# Patient Record
Sex: FEMALE | URBAN - METROPOLITAN AREA
[De-identification: names, ages, dates, MRNs, and addresses within clinical notes are randomized per-mention and may not be internally consistent; named-entity substitution may affect disease eponyms.]

---

## 2022-09-08 ENCOUNTER — TELEPHONE (OUTPATIENT)
Dept: OBSTETRICS AND GYNECOLOGY | Facility: CLINIC | Age: 52
End: 2022-09-08
Payer: COMMERCIAL

## 2022-09-08 NOTE — TELEPHONE ENCOUNTER
----- Message from Татьяна Perera sent at 9/8/2022  3:27 PM CDT -----  Contact: GERMAN- mom  Type:  Sooner Apoointment Request    Caller is requesting a sooner appointment.  Caller declined first available appointment listed below.  Caller will not accept being placed on the waitlist and is requesting a message be sent to doctor.  Name of Caller:German (mother)  When is the first available appointment? 2/4 @ 400pm  Symptoms: Hormonal issues  Would the patient rather a call back or a response via [facility]?  Call back   Best Call Back Number: [phone]   Additional Information: She saw Dr Tabor 20 years ago. Has several issues and needs to be seen asap.

## 2022-09-21 ENCOUNTER — TELEPHONE (OUTPATIENT)
Dept: OBSTETRICS AND GYNECOLOGY | Facility: CLINIC | Age: 52
End: 2022-09-21
Payer: COMMERCIAL

## 2022-09-21 NOTE — TELEPHONE ENCOUNTER
Note     ----- Message from Татьяна Perera sent at 9/8/2022  3:27 PM CDT -----  Contact: GERMAN- mom  Type:  Sooner Apoointment Request     Caller is requesting a sooner appointment.  Caller declined first available appointment listed below.  Caller will not accept being placed on the waitlist and is requesting a message be sent to doctor.  Name of Caller:German (mother)  When is the first available appointment? 2/4 @ 400pm  Symptoms: Hormonal issues  Would the patient rather a call back or a response via [facility]?  Call back   Best Call Back Number: [phone]   Additional Information: She saw Dr Tabor 20 years ago. Has several issues and needs to be seen asap.

## 2022-09-21 NOTE — TELEPHONE ENCOUNTER
Patient has not been seen in over ten years   She has not seen a GYN since her last visit with Dr Tabor  She is considered a new patient  Dr Tabor is not accepting New Gyn patients at this time per Jay Natarajan LPN    Phone call returned. No Answer and no voicemail to leave a message.

## 2022-09-22 ENCOUNTER — TELEPHONE (OUTPATIENT)
Dept: OBSTETRICS AND GYNECOLOGY | Facility: CLINIC | Age: 52
End: 2022-09-22
Payer: COMMERCIAL

## 2022-09-22 NOTE — TELEPHONE ENCOUNTER
"Spoke to patient's mother Meggan, she requested an email address for Dr Tabor so that her daughter could contact him about what is going on with her health. Meggan states that the patient is a "nervous wreck" and experiencing a lot of issues and that Dr Tabor would need to speak with her and not the patient. She states the patient previously saw Dr Tabor who delivered her quadruplets in 2001 and performed her hysterectomy. She is currently on unpaid leave from [facility] and states she needs an appointment with Dr Tabor. Patient's mother was previously told that since the patient has not been seen by Dr Tabor or an [facility] provider she would be considered a new patient and Dr Tabor is not currently accepting new patients. Patients mother is aware that we are unable to use email for communication as it is not secure but we could pass the message along to Dr Tabor and let him know that they are requesting a call back from him personally. The patients mother request that the call back be made to her at [phone]. She would not further elaborate on the issues that were going on.   "

## 2023-09-06 NOTE — TELEPHONE ENCOUNTER
----- Message from Татьяна Perera sent at 9/22/2022 12:32 PM CDT -----  Contact: self  She would like to get an email address for Dr Tabor. Caller declined to state anything else in the message. Please call back at [phone]       Abdomen , soft, nontender, nondistended , no guarding or rigidity , no masses palpable , normal bowel sounds , Liver and Spleen,  no hepatosplenomegaly , liver nontender